# Patient Record
Sex: FEMALE | Race: WHITE | NOT HISPANIC OR LATINO | Employment: UNEMPLOYED | URBAN - METROPOLITAN AREA
[De-identification: names, ages, dates, MRNs, and addresses within clinical notes are randomized per-mention and may not be internally consistent; named-entity substitution may affect disease eponyms.]

---

## 2023-05-30 ENCOUNTER — OFFICE VISIT (OUTPATIENT)
Dept: PRIMARY CARE | Facility: CLINIC | Age: 38
End: 2023-05-30
Payer: COMMERCIAL

## 2023-05-30 VITALS
BODY MASS INDEX: 20.66 KG/M2 | SYSTOLIC BLOOD PRESSURE: 110 MMHG | HEART RATE: 74 BPM | WEIGHT: 124 LBS | OXYGEN SATURATION: 100 % | HEIGHT: 65 IN | DIASTOLIC BLOOD PRESSURE: 70 MMHG

## 2023-05-30 DIAGNOSIS — Z00.00 HEALTH CARE MAINTENANCE: ICD-10-CM

## 2023-05-30 DIAGNOSIS — R59.9 SWELLING OF LYMPH NODE: Primary | ICD-10-CM

## 2023-05-30 PROBLEM — K21.9 GERD (GASTROESOPHAGEAL REFLUX DISEASE): Status: ACTIVE | Noted: 2023-05-30

## 2023-05-30 PROBLEM — F41.9 ANXIETY: Status: ACTIVE | Noted: 2023-05-30

## 2023-05-30 PROBLEM — R60.0 SUBMANDIBULAR GLAND SWELLING: Status: ACTIVE | Noted: 2023-05-30

## 2023-05-30 PROBLEM — K42.9 PERIUMBILICAL HERNIA: Status: RESOLVED | Noted: 2022-10-12 | Resolved: 2023-05-30

## 2023-05-30 PROCEDURE — 1036F TOBACCO NON-USER: CPT | Performed by: FAMILY MEDICINE

## 2023-05-30 PROCEDURE — 99202 OFFICE O/P NEW SF 15 MIN: CPT | Performed by: FAMILY MEDICINE

## 2023-05-30 RX ORDER — PANTOPRAZOLE SODIUM 40 MG/1
1 TABLET, DELAYED RELEASE ORAL DAILY
COMMUNITY
Start: 2022-10-12 | End: 2024-01-04 | Stop reason: SDUPTHER

## 2023-05-30 RX ORDER — DROSPIRENONE AND ETHINYL ESTRADIOL 0.02-3(28)
1 KIT ORAL
COMMUNITY
Start: 2022-06-20 | End: 2023-10-30 | Stop reason: ALTCHOICE

## 2023-05-30 RX ORDER — ALPRAZOLAM 0.25 MG/1
0.25 TABLET ORAL NIGHTLY PRN
COMMUNITY

## 2023-05-30 NOTE — PROGRESS NOTES
Subjective   Patient ID: Cristine Ann is a 37 y.o. female who presents for Swollen Glands (Some pain on the right side of her neck, some pain that comes and goes has been going on for a couple months. ).  HPI  MONO when she was 10 years old and had significant lymph node swelling - right side never went back to normal    H/o Recurring strep in the past - no recent positive culture    [facility] - negative strep  Right sided adenopathy was notable  Took a Z pack and seemed to help     2 weeks later had itching along her breasts - saw gyn, Dr. Paula  He noted  right sided gland swelling    Saw ENT and she is scheduled for CT scan next week  Follow up scan     Pain into her ears, minimal congestion but she is not taking anything on regular basis to help with allergies  No fever or chills, does not feel sick     She has history of suspected GERD - on Pantoprazole but taking only prn - not regularly  No recent recurrence of GERD but she did not have significant classic GERD symptoms - more abdominal pain and indigestion    ANXIETY: ABE Tran at [facility], ob/gyn   Previously on Zoloft for postpartum depression - now off and only on Alprazolam prn for anxiety - takes every other day on the average    Review of Systems    Review of Systems negative except as noted in HPI and Chief complaint.     Objective               Physical Exam  Constitutional:       General: She is not in acute distress.     Appearance: Normal appearance. She is not ill-appearing.   HENT:      Head: Normocephalic and atraumatic.   Neck:      Vascular: No carotid bruit.      Comments: Very mild shotty adenopathy right anterior cervical  Cardiovascular:      Rate and Rhythm: Normal rate and regular rhythm.      Pulses: Normal pulses.      Heart sounds: Normal heart sounds. No murmur heard.     No gallop.   Pulmonary:      Effort: Pulmonary effort is normal.      Breath sounds: Normal breath sounds. No wheezing, rhonchi or rales.  "  Musculoskeletal:      Cervical back: Normal range of motion and neck supple. No rigidity or tenderness.   Lymphadenopathy:      Cervical: Cervical adenopathy present.   Skin:     General: Skin is warm and dry.   Neurological:      Mental Status: She is alert.   Psychiatric:         Mood and Affect: Mood normal.         Behavior: Behavior normal.       /70 (BP Location: Right arm, Patient Position: Sitting, BP Cuff Size: Adult)   Pulse 74   Ht 1.651 m (5' 5\")   Wt 56.2 kg (124 lb)   SpO2 100%   BMI 20.63 kg/m²      Assessment/Plan   Problem List Items Addressed This Visit          Immune    Swelling of lymph node - Primary    Relevant Orders    Comprehensive Metabolic Panel     Other Visit Diagnoses       Health care maintenance        Relevant Orders    Comprehensive Metabolic Panel    Lipid Panel    TSH with reflex to Free T4 if abnormal    Vitamin D, Total    CBC and Auto Differential          Checking labs as above - await CT scan results and recommendations of ENT.  Consider further workup or evaluation with ENT functional eval with ST  Recommend resuming Pantoprazole on regular basis to see if throat symptoms improve.  Consider trial of OTC antihistamine as well.    Suggest scheduling visit for CPE - lab orders placed as above     "

## 2023-10-26 ENCOUNTER — LAB (OUTPATIENT)
Dept: LAB | Facility: LAB | Age: 38
End: 2023-10-26
Payer: COMMERCIAL

## 2023-10-26 DIAGNOSIS — Z00.00 HEALTH CARE MAINTENANCE: ICD-10-CM

## 2023-10-26 DIAGNOSIS — R59.9 SWELLING OF LYMPH NODE: ICD-10-CM

## 2023-10-26 LAB
25(OH)D3 SERPL-MCNC: 24 NG/ML (ref 30–100)
ALBUMIN SERPL BCP-MCNC: 4.6 G/DL (ref 3.4–5)
ALP SERPL-CCNC: 50 U/L (ref 33–110)
ALT SERPL W P-5'-P-CCNC: 11 U/L (ref 7–45)
ANION GAP SERPL CALC-SCNC: 13 MMOL/L (ref 10–20)
AST SERPL W P-5'-P-CCNC: 16 U/L (ref 9–39)
BASOPHILS # BLD AUTO: 0.07 X10*3/UL (ref 0–0.1)
BASOPHILS NFR BLD AUTO: 1.4 %
BILIRUB SERPL-MCNC: 1 MG/DL (ref 0–1.2)
BUN SERPL-MCNC: 10 MG/DL (ref 6–23)
CALCIUM SERPL-MCNC: 9.7 MG/DL (ref 8.6–10.6)
CHLORIDE SERPL-SCNC: 101 MMOL/L (ref 98–107)
CHOLEST SERPL-MCNC: 213 MG/DL (ref 0–199)
CHOLESTEROL/HDL RATIO: 2.8
CO2 SERPL-SCNC: 29 MMOL/L (ref 21–32)
CREAT SERPL-MCNC: 0.74 MG/DL (ref 0.5–1.05)
EOSINOPHIL # BLD AUTO: 0.43 X10*3/UL (ref 0–0.7)
EOSINOPHIL NFR BLD AUTO: 8.3 %
ERYTHROCYTE [DISTWIDTH] IN BLOOD BY AUTOMATED COUNT: 13.5 % (ref 11.5–14.5)
GFR SERPL CREATININE-BSD FRML MDRD: >90 ML/MIN/1.73M*2
GLUCOSE SERPL-MCNC: 94 MG/DL (ref 74–99)
HCT VFR BLD AUTO: 41 % (ref 36–46)
HDLC SERPL-MCNC: 74.8 MG/DL
HGB BLD-MCNC: 13 G/DL (ref 12–16)
IMM GRANULOCYTES # BLD AUTO: 0 X10*3/UL (ref 0–0.7)
IMM GRANULOCYTES NFR BLD AUTO: 0 % (ref 0–0.9)
LDLC SERPL CALC-MCNC: 123 MG/DL
LYMPHOCYTES # BLD AUTO: 1.65 X10*3/UL (ref 1.2–4.8)
LYMPHOCYTES NFR BLD AUTO: 32 %
MCH RBC QN AUTO: 29.3 PG (ref 26–34)
MCHC RBC AUTO-ENTMCNC: 31.7 G/DL (ref 32–36)
MCV RBC AUTO: 92 FL (ref 80–100)
MONOCYTES # BLD AUTO: 0.4 X10*3/UL (ref 0.1–1)
MONOCYTES NFR BLD AUTO: 7.8 %
NEUTROPHILS # BLD AUTO: 2.6 X10*3/UL (ref 1.2–7.7)
NEUTROPHILS NFR BLD AUTO: 50.5 %
NON HDL CHOLESTEROL: 138 MG/DL (ref 0–149)
NRBC BLD-RTO: 0 /100 WBCS (ref 0–0)
PLATELET # BLD AUTO: 282 X10*3/UL (ref 150–450)
PMV BLD AUTO: 10.3 FL (ref 7.5–11.5)
POTASSIUM SERPL-SCNC: 4.7 MMOL/L (ref 3.5–5.3)
PROT SERPL-MCNC: 7.5 G/DL (ref 6.4–8.2)
RBC # BLD AUTO: 4.44 X10*6/UL (ref 4–5.2)
SODIUM SERPL-SCNC: 138 MMOL/L (ref 136–145)
TRIGL SERPL-MCNC: 75 MG/DL (ref 0–149)
TSH SERPL-ACNC: 1.47 MIU/L (ref 0.44–3.98)
VLDL: 15 MG/DL (ref 0–40)
WBC # BLD AUTO: 5.2 X10*3/UL (ref 4.4–11.3)

## 2023-10-26 PROCEDURE — 82306 VITAMIN D 25 HYDROXY: CPT

## 2023-10-26 PROCEDURE — 85025 COMPLETE CBC W/AUTO DIFF WBC: CPT

## 2023-10-26 PROCEDURE — 84443 ASSAY THYROID STIM HORMONE: CPT

## 2023-10-26 PROCEDURE — 36415 COLL VENOUS BLD VENIPUNCTURE: CPT

## 2023-10-26 PROCEDURE — 80053 COMPREHEN METABOLIC PANEL: CPT

## 2023-10-26 PROCEDURE — 80061 LIPID PANEL: CPT

## 2023-10-30 ENCOUNTER — OFFICE VISIT (OUTPATIENT)
Dept: PRIMARY CARE | Facility: CLINIC | Age: 38
End: 2023-10-30
Payer: COMMERCIAL

## 2023-10-30 VITALS
HEART RATE: 74 BPM | DIASTOLIC BLOOD PRESSURE: 70 MMHG | OXYGEN SATURATION: 100 % | HEIGHT: 65 IN | WEIGHT: 127.4 LBS | SYSTOLIC BLOOD PRESSURE: 120 MMHG | BODY MASS INDEX: 21.23 KG/M2

## 2023-10-30 DIAGNOSIS — E55.9 VITAMIN D DEFICIENCY: Primary | ICD-10-CM

## 2023-10-30 PROCEDURE — 99395 PREV VISIT EST AGE 18-39: CPT | Performed by: FAMILY MEDICINE

## 2023-10-30 PROCEDURE — 1036F TOBACCO NON-USER: CPT | Performed by: FAMILY MEDICINE

## 2023-10-30 RX ORDER — ERGOCALCIFEROL 1.25 MG/1
50000 CAPSULE ORAL
Qty: 6 CAPSULE | Refills: 0 | Status: SHIPPED | OUTPATIENT
Start: 2023-10-30 | End: 2023-12-11

## 2023-10-30 RX ORDER — CITALOPRAM 20 MG/1
20 TABLET, FILM COATED ORAL
COMMUNITY
Start: 2023-10-18 | End: 2023-10-30 | Stop reason: ALTCHOICE

## 2023-10-30 ASSESSMENT — PATIENT HEALTH QUESTIONNAIRE - PHQ9
8. MOVING OR SPEAKING SO SLOWLY THAT OTHER PEOPLE COULD HAVE NOTICED. OR THE OPPOSITE, BEING SO FIGETY OR RESTLESS THAT YOU HAVE BEEN MOVING AROUND A LOT MORE THAN USUAL: NOT AT ALL
2. FEELING DOWN, DEPRESSED OR HOPELESS: NOT AT ALL
1. LITTLE INTEREST OR PLEASURE IN DOING THINGS: NOT AT ALL
10. IF YOU CHECKED OFF ANY PROBLEMS, HOW DIFFICULT HAVE THESE PROBLEMS MADE IT FOR YOU TO DO YOUR WORK, TAKE CARE OF THINGS AT HOME, OR GET ALONG WITH OTHER PEOPLE: NOT DIFFICULT AT ALL
9. THOUGHTS THAT YOU WOULD BE BETTER OFF DEAD, OR OF HURTING YOURSELF: NOT AT ALL
3. TROUBLE FALLING OR STAYING ASLEEP OR SLEEPING TOO MUCH: NOT AT ALL
7. TROUBLE CONCENTRATING ON THINGS, SUCH AS READING THE NEWSPAPER OR WATCHING TELEVISION: NOT AT ALL
SUM OF ALL RESPONSES TO PHQ QUESTIONS 1-9: 1
SUM OF ALL RESPONSES TO PHQ9 QUESTIONS 1 AND 2: 0
4. FEELING TIRED OR HAVING LITTLE ENERGY: SEVERAL DAYS
6. FEELING BAD ABOUT YOURSELF - OR THAT YOU ARE A FAILURE OR HAVE LET YOURSELF OR YOUR FAMILY DOWN: NOT AT ALL
5. POOR APPETITE OR OVEREATING: NOT AT ALL

## 2023-10-30 ASSESSMENT — ANXIETY QUESTIONNAIRES
5. BEING SO RESTLESS THAT IT IS HARD TO SIT STILL: NOT AT ALL
6. BECOMING EASILY ANNOYED OR IRRITABLE: MORE THAN HALF THE DAYS
1. FEELING NERVOUS, ANXIOUS, OR ON EDGE: SEVERAL DAYS
IF YOU CHECKED OFF ANY PROBLEMS ON THIS QUESTIONNAIRE, HOW DIFFICULT HAVE THESE PROBLEMS MADE IT FOR YOU TO DO YOUR WORK, TAKE CARE OF THINGS AT HOME, OR GET ALONG WITH OTHER PEOPLE: SOMEWHAT DIFFICULT
3. WORRYING TOO MUCH ABOUT DIFFERENT THINGS: SEVERAL DAYS
4. TROUBLE RELAXING: SEVERAL DAYS
GAD7 TOTAL SCORE: 7
7. FEELING AFRAID AS IF SOMETHING AWFUL MIGHT HAPPEN: SEVERAL DAYS
2. NOT BEING ABLE TO STOP OR CONTROL WORRYING: SEVERAL DAYS

## 2023-10-30 ASSESSMENT — LIFESTYLE VARIABLES
HOW OFTEN DO YOU HAVE A DRINK CONTAINING ALCOHOL: 2-4 TIMES A MONTH
AUDIT-C TOTAL SCORE: 2
HOW OFTEN DO YOU HAVE SIX OR MORE DRINKS ON ONE OCCASION: NEVER
HOW MANY STANDARD DRINKS CONTAINING ALCOHOL DO YOU HAVE ON A TYPICAL DAY: 1 OR 2
SKIP TO QUESTIONS 9-10: 1

## 2023-10-30 NOTE — PROGRESS NOTES
"  Evin Ann is a 38 y.o. female and is here for a comprehensive physical exam.     Pain in her hands and feet on and off for the last month  Pain lasts 3 seconds or so and takes her breath away with severity   Resolved over the last week or so    Did have strep in the middle of the illness AND her kids were sick around the same time  No rash, no fever or chills    Do you take any herbs or supplements that were not prescribed by a doctor? no  Are you taking calcium supplements? no  Are you taking aspirin daily? no    SOC Hx:   Tobacco Use: Low Risk  (10/30/2023)    Patient History     Smoking Tobacco Use: Never     Smokeless Tobacco Use: Never     Passive Exposure: Not on file     Alcohol Use: Not At Risk (10/30/2023)    AUDIT-C     Frequency of Alcohol Consumption: 2-4 times a month     Average Number of Drinks: 1 or 2     Frequency of Binge Drinking: Never       DIET: regular, well rounded    EXERCISE:  not as regular as she would like    ELIMINATION: no constipation or diarrhea     No urinary issues    SLEEP: disturbed by 2 waking with toddler    MOODS: mild anxiety - followed Dr. Zhu at [facility]   PHQ-9: Patient Health Questionnaire-9 Score: 1    EDIN-7: EDIN-7 Total Score: 7     OB/GYN: followed by Dr. Paula  History of breast cancer in Maternal Grandmother.  Menstrual cycles - regular - heavier since 3rd child       History:  LMP: No LMP recorded.  Last pap date: 3/13/2023  Abnormal pap? no  MAMMO: ordered for baseline by ob/gyn    Review of Systems   Review of Systems negative except as noted in HPI and Chief complaint.     Objective     /70 (BP Location: Left arm, Patient Position: Sitting, BP Cuff Size: Adult)   Pulse 74   Ht 1.651 m (5' 5\")   Wt 57.8 kg (127 lb 6.4 oz)   SpO2 100%   BMI 21.20 kg/m²      Physical Exam    Assessment/Plan   Healthy female exam.      1. Fasting labs reviewed today.     2. Patient Counseling:  --Nutrition: Stressed importance of moderation " in sodium/caffeine intake, saturated fat and cholesterol, caloric balance, sufficient intake of fresh fruits, vegetables, fiber, calcium, iron, and 1 mg of folate supplement per day (for females capable of pregnancy).  --Exercise: Stressed the importance of regular exercise.   --Immunizations reviewed.  --Discussed benefits of screening colonoscopy.    3. Discussed the patient's BMI with her.  The BMI is in the acceptable range.    Problem List Items Addressed This Visit    None  Visit Diagnoses       Vitamin D deficiency    -  Primary    Relevant Medications    ergocalciferol (Vitamin D-2) 1.25 MG (92278 UT) capsule         HAND PAIN: suspect possible viral illness, possible hand-foot-mouth; will monitor for any recurrence    Follow up next physical in 1 year

## 2024-01-04 ENCOUNTER — OFFICE VISIT (OUTPATIENT)
Dept: PRIMARY CARE | Facility: CLINIC | Age: 39
End: 2024-01-04
Payer: COMMERCIAL

## 2024-01-04 VITALS
WEIGHT: 128 LBS | OXYGEN SATURATION: 98 % | BODY MASS INDEX: 21.3 KG/M2 | SYSTOLIC BLOOD PRESSURE: 120 MMHG | HEART RATE: 73 BPM | DIASTOLIC BLOOD PRESSURE: 80 MMHG

## 2024-01-04 DIAGNOSIS — R00.2 PALPITATIONS: ICD-10-CM

## 2024-01-04 DIAGNOSIS — R01.1 MURMUR, CARDIAC: ICD-10-CM

## 2024-01-04 DIAGNOSIS — K29.00 ACUTE GASTRITIS WITHOUT HEMORRHAGE, UNSPECIFIED GASTRITIS TYPE: ICD-10-CM

## 2024-01-04 DIAGNOSIS — M94.0 COSTOCHONDRITIS: Primary | ICD-10-CM

## 2024-01-04 PROCEDURE — 1036F TOBACCO NON-USER: CPT | Performed by: FAMILY MEDICINE

## 2024-01-04 PROCEDURE — 99214 OFFICE O/P EST MOD 30 MIN: CPT | Performed by: FAMILY MEDICINE

## 2024-01-04 RX ORDER — ERGOCALCIFEROL 1.25 MG/1
1 CAPSULE ORAL
COMMUNITY
Start: 2023-12-17

## 2024-01-04 RX ORDER — MELOXICAM 15 MG/1
15 TABLET ORAL DAILY
Qty: 30 TABLET | Refills: 11 | Status: SHIPPED | OUTPATIENT
Start: 2024-01-04 | End: 2024-01-22 | Stop reason: SINTOL

## 2024-01-04 RX ORDER — PANTOPRAZOLE SODIUM 40 MG/1
40 TABLET, DELAYED RELEASE ORAL DAILY
Qty: 30 TABLET | Refills: 2 | Status: SHIPPED | OUTPATIENT
Start: 2024-01-04 | End: 2024-01-22

## 2024-01-04 ASSESSMENT — LIFESTYLE VARIABLES
HOW OFTEN DO YOU HAVE A DRINK CONTAINING ALCOHOL: 4 OR MORE TIMES A WEEK
AUDIT-C TOTAL SCORE: 4
SKIP TO QUESTIONS 9-10: 1
HOW OFTEN DO YOU HAVE SIX OR MORE DRINKS ON ONE OCCASION: NEVER
HOW MANY STANDARD DRINKS CONTAINING ALCOHOL DO YOU HAVE ON A TYPICAL DAY: 1 OR 2

## 2024-01-08 ENCOUNTER — PATIENT MESSAGE (OUTPATIENT)
Dept: PRIMARY CARE | Facility: CLINIC | Age: 39
End: 2024-01-08

## 2024-01-11 ENCOUNTER — APPOINTMENT (OUTPATIENT)
Dept: PRIMARY CARE | Facility: CLINIC | Age: 39
End: 2024-01-11

## 2024-01-19 ENCOUNTER — HOSPITAL ENCOUNTER (OUTPATIENT)
Dept: CARDIOLOGY | Facility: CLINIC | Age: 39
Discharge: HOME | End: 2024-01-19
Payer: COMMERCIAL

## 2024-01-19 DIAGNOSIS — R01.1 MURMUR, CARDIAC: Primary | ICD-10-CM

## 2024-01-19 DIAGNOSIS — R01.1 MURMUR, CARDIAC: ICD-10-CM

## 2024-01-19 LAB
AORTIC VALVE PEAK VELOCITY: 1.31 M/S
AV PEAK GRADIENT: 6.9 MMHG
AVA (PEAK VEL): 2.21 CM2
EJECTION FRACTION APICAL 4 CHAMBER: 63.8
EJECTION FRACTION: 60 %
LEFT ATRIUM VOLUME AREA LENGTH INDEX BSA: 16.2 ML/M2
LEFT VENTRICLE INTERNAL DIMENSION DIASTOLE: 3.9 CM (ref 3.5–6)
LEFT VENTRICULAR OUTFLOW TRACT DIAMETER: 1.82 CM
MITRAL VALVE E/A RATIO: 1.33
MITRAL VALVE E/E' RATIO: 6.09
RIGHT VENTRICLE FREE WALL PEAK S': 13 CM/S
RIGHT VENTRICLE PEAK SYSTOLIC PRESSURE: 21.2 MMHG
TRICUSPID ANNULAR PLANE SYSTOLIC EXCURSION: 2.8 CM

## 2024-01-19 PROCEDURE — 93306 TTE W/DOPPLER COMPLETE: CPT | Performed by: INTERNAL MEDICINE

## 2024-01-19 PROCEDURE — 93306 TTE W/DOPPLER COMPLETE: CPT

## 2024-01-22 ENCOUNTER — OFFICE VISIT (OUTPATIENT)
Dept: PRIMARY CARE | Facility: CLINIC | Age: 39
End: 2024-01-22
Payer: COMMERCIAL

## 2024-01-22 VITALS
HEART RATE: 92 BPM | WEIGHT: 129 LBS | OXYGEN SATURATION: 98 % | BODY MASS INDEX: 21.47 KG/M2 | SYSTOLIC BLOOD PRESSURE: 124 MMHG | DIASTOLIC BLOOD PRESSURE: 78 MMHG

## 2024-01-22 DIAGNOSIS — F41.9 ANXIETY: ICD-10-CM

## 2024-01-22 DIAGNOSIS — R10.30 LOWER ABDOMINAL PAIN: Primary | ICD-10-CM

## 2024-01-22 PROCEDURE — 1036F TOBACCO NON-USER: CPT | Performed by: FAMILY MEDICINE

## 2024-01-22 PROCEDURE — 99214 OFFICE O/P EST MOD 30 MIN: CPT | Performed by: FAMILY MEDICINE

## 2024-01-22 RX ORDER — ESCITALOPRAM OXALATE 10 MG/1
10 TABLET ORAL DAILY
Qty: 30 TABLET | Refills: 1 | Status: SHIPPED | OUTPATIENT
Start: 2024-01-22

## 2024-01-22 NOTE — PROGRESS NOTES
Subjective   Patient ID: Cristine Ann is a 38 y.o. female who presents for Abdominal Pain.    HPI    LEFT SIDED ABDOMINAL PAIN:   Pain described as burning - comes and goes  One episode after yelling at her dogs  Shoveling snow and came in and ha severe pain when she was done   Pain with sitting up or using her abs  No changes in activity- no regular exercise    Tried Meloxicam for 5 days but had headaches and stopped  No change in symptoms    Review of Systems    Review of Systems negative except as noted in HPI and Chief complaint.     Objective                 /78 (BP Location: Left arm, Patient Position: Sitting, BP Cuff Size: Adult)   Pulse 92   Wt 58.5 kg (129 lb)   SpO2 98%   BMI 21.47 kg/m²      Physical Exam  Constitutional:       General: She is not in acute distress.     Appearance: Normal appearance. She is not ill-appearing.   HENT:      Head: Normocephalic and atraumatic.   Neck:      Vascular: No carotid bruit.   Cardiovascular:      Rate and Rhythm: Normal rate and regular rhythm.      Pulses: Normal pulses.      Heart sounds: Normal heart sounds. No murmur heard.     No gallop.   Pulmonary:      Effort: Pulmonary effort is normal.      Breath sounds: Normal breath sounds. No wheezing, rhonchi or rales.   Abdominal:      General: Abdomen is flat. Bowel sounds are normal. There is no distension.      Palpations: Abdomen is soft. There is no mass.      Tenderness: There is no abdominal tenderness. There is no right CVA tenderness, left CVA tenderness, guarding or rebound.      Hernia: No hernia is present.   Musculoskeletal:      Cervical back: Normal range of motion and neck supple. No rigidity or tenderness.   Lymphadenopathy:      Cervical: No cervical adenopathy.   Skin:     General: Skin is warm and dry.   Neurological:      Mental Status: She is alert.   Psychiatric:         Mood and Affect: Mood normal.         Behavior: Behavior normal.         Assessment/Plan   Problem List Items  Addressed This Visit       Anxiety     Discussed possible treatments for generalized anxiety.  Rx for Escitalopram given.  Follow up 4-6 weeks for recheck after starting medication.    Consider referral to counseling or our Behavioral Collaborative Care Team.         Relevant Medications    escitalopram (Lexapro) 10 mg tablet     Other Visit Diagnoses       Lower abdominal pain    -  Primary    suspect musculoskeletal or cartilige source of pain.  If not improving consider additional imaging.

## 2024-01-29 ENCOUNTER — PATIENT MESSAGE (OUTPATIENT)
Dept: PRIMARY CARE | Facility: CLINIC | Age: 39
End: 2024-01-29

## 2024-01-29 DIAGNOSIS — R10.84 GENERALIZED ABDOMINAL PAIN: ICD-10-CM

## 2024-01-29 DIAGNOSIS — R10.30 LOWER ABDOMINAL PAIN: Primary | ICD-10-CM

## 2024-01-29 DIAGNOSIS — R10.9 FLANK PAIN: ICD-10-CM

## 2024-02-01 NOTE — ASSESSMENT & PLAN NOTE
Discussed possible treatments for generalized anxiety.  Rx for Escitalopram given.  Follow up 4-6 weeks for recheck after starting medication.    Consider referral to counseling or our Behavioral Collaborative Care Team.

## 2024-02-12 ENCOUNTER — HOSPITAL ENCOUNTER (OUTPATIENT)
Dept: RADIOLOGY | Facility: CLINIC | Age: 39
End: 2024-02-12

## 2024-02-15 ENCOUNTER — OFFICE VISIT (OUTPATIENT)
Dept: GASTROENTEROLOGY | Facility: CLINIC | Age: 39
End: 2024-02-15

## 2024-02-15 VITALS — HEART RATE: 84 BPM | BODY MASS INDEX: 20.99 KG/M2 | WEIGHT: 126 LBS | HEIGHT: 65 IN

## 2024-02-15 DIAGNOSIS — R10.12 LEFT UPPER QUADRANT ABDOMINAL PAIN: Primary | ICD-10-CM

## 2024-02-15 DIAGNOSIS — K90.49 FOOD INTOLERANCE: ICD-10-CM

## 2024-02-15 PROCEDURE — 1036F TOBACCO NON-USER: CPT | Performed by: INTERNAL MEDICINE

## 2024-02-15 PROCEDURE — 99204 OFFICE O/P NEW MOD 45 MIN: CPT | Performed by: INTERNAL MEDICINE

## 2024-02-15 RX ORDER — MELOXICAM 15 MG/1
15 TABLET ORAL
COMMUNITY
Start: 2024-01-04 | End: 2025-01-03

## 2024-02-15 ASSESSMENT — ENCOUNTER SYMPTOMS: SHORTNESS OF BREATH: 0

## 2024-02-15 NOTE — PROGRESS NOTES
REASON FOR VISIT:  Abdominal pain   PCP (requesting provider): Yuliana Elizabeth DO.    HPI:  Cristine Ann is a 38 y.o. female with a past medical history of anxiety being evaluated for abdominal pain. CT A/P w/ contrast unremarkable (10/2022).    She reports LUQ abdominal pain that will radiate into her back. She has sensation of something being swollen in the left side. She notes that the pain will feel like a burning or ache. She notes that the pain will feel like a knot. She reports that she had a CT scan in the ER done about 2 years ago at [facility] that was unrevealing. She was told to potentially follow-up with GI. She saw PCP regarding this as well recently. She notes the pain was exacerbated by a cough. She also noted the pain feels like it is in her rib cage. PCP thought that pain was possibly swollen cartilage. She has a lot of abdominal bloating with certain foods. This discomfort is intermittent and will last a few days at a time. Bowel habits are once daily. No regular NSAIDs. She has occasional reflux and has taken Prilosec in the past. She does struggle with anxiety but unsure if it contributes. No blood in the stool.    PSurgHx: No abdominal surgeries     FamHx: No GI cancer    PAST MEDICAL HISTORY  Past Medical History:   Diagnosis Date    Periumbilical hernia 10/12/2022    Formatting of this note might be different from the original. Seen on CT 10/2022.Small ventral periumbilical hernia, containing peritoneal fat and short segment loops of bowel.       PAST SURGICAL HISTORY  No past surgical history on file.    FAMILY HISTORY  Family History   Problem Relation Name Age of Onset    Breast cancer Maternal Grandmother         SOCIAL HISTORY   reports that she has never smoked. She has never used smokeless tobacco. She reports current alcohol use of about 7.0 standard drinks of alcohol per week. She reports that she does not use drugs.    REVIEW OF SYSTEMS  Review of Systems   Respiratory:  Negative for  shortness of breath.    Cardiovascular:  Negative for chest pain.   All other systems reviewed and are negative.    A 10+ point review of systems was otherwise negative except as noted and per HPI.    ALLERGIES  Allergies   Allergen Reactions    Erythromycin Rash and Unknown     Patient states she can tolerate Azythromycin.    Penicillins Unknown     Childhood    Sulfa (Sulfonamide Antibiotics) Unknown     Childhood    Sulfamethoxazole Unknown       MEDICATIONS  Current Outpatient Medications   Medication Instructions    ALPRAZolam (XANAX) 0.25 mg, oral, Nightly PRN    ergocalciferol (Vitamin D-2) 1.25 MG (19927 UT) capsule 1 capsule, oral, Weekly    escitalopram (LEXAPRO) 10 mg, oral, Daily    meloxicam (MOBIC) 15 mg, oral, Daily RT       VITALS  Vitals:    02/15/24 1524   Pulse: 84      Body mass index is 21.29 kg/m².    PHYSICAL EXAM  CONSTITUTIONAL: NAD, appears stated age  EYES: anicteric sclera, sclera clear  HEAD: normocephalic, atraumatic   NECK: supple   PULMONARY: CTAB  CARDIOVASCULAR: RRR, no M/R/G appreciated   ABDOMEN: soft, NTND, +BS, no rebound or guarding   MUSCULOSKELETAL: no edema  SKIN: no jaundice   PSYCHIATRIC: AOx3, appropriate insight and judgement    LABS  WBC (x10*3/uL)   Date Value   10/26/2023 5.2     Hemoglobin (g/dL)   Date Value   10/26/2023 13.0     Platelets (x10*3/uL)   Date Value   10/26/2023 282     Sodium (mmol/L)   Date Value   10/26/2023 138     Potassium (mmol/L)   Date Value   10/26/2023 4.7     Chloride (mmol/L)   Date Value   10/26/2023 101     Bicarbonate (mmol/L)   Date Value   10/26/2023 29     Urea Nitrogen (mg/dL)   Date Value   10/26/2023 10     Creatinine (mg/dL)   Date Value   10/26/2023 0.74     Calcium (mg/dL)   Date Value   10/26/2023 9.7     Total Protein (g/dL)   Date Value   10/26/2023 7.5     Bilirubin, Total (mg/dL)   Date Value   10/26/2023 1.0     Alkaline Phosphatase (U/L)   Date Value   10/26/2023 50     ALT (U/L)   Date Value   10/26/2023 11     AST  "(U/L)   Date Value   10/26/2023 16     Glucose (mg/dL)   Date Value   10/26/2023 94     No results found for: \"LIPASE\", \"CRP\"    ASSESSMENT/PLAN  Cristine Ann is a 38 y.o. female with a past medical history of anxiety being evaluated for abdominal pain. CT A/P w/ contrast unremarkable (10/2022). Patient with intermittent left-sided rib cage and back pain that is sometimes positional. This pain sounds more musculoskeletal or possibly costochondritis.  Given the reassuring CT for the same symptoms, I have a low suspicion that a gastrointestinal issue is the cause of this pain. She does note some food intolerances so we will screen for celiac disease.    -Check celiac panel  -If celiac panel negative, then would hold off on further GI evaluation as unlikely to be high yield  -Consider discussion with PCP regarding costochondritis versus musculoskeletal pain    Follow-up in the office PRN.    Signature: Yg Pozo MD  "

## 2024-02-15 NOTE — PATIENT INSTRUCTIONS
Check blood test for celiac disease. If this is unrevealing, then I do not feel strongly about further GI testing as I have a lower suspicion that your left-sided back and rib cage pain is related to a gastrointestinal problem. Consider discussion with your primary care doctor about costochondritis or musculoskeletal pain.

## 2024-02-26 ENCOUNTER — HOSPITAL ENCOUNTER (OUTPATIENT)
Dept: RADIOLOGY | Facility: CLINIC | Age: 39
End: 2024-02-26

## 2024-04-11 NOTE — PROGRESS NOTES
"Subjective   Patient ID: Cristine Ann is a 38 y.o. female who presents for Abdominal Pain (Left upper quadrant, has been going on and off for over a year now./Denies constipation, diarrhea, vomiting.).    HPI    ABDOMINAL PAIN:   Initially seen at [facility]  ED 10/2022  Left sided abdominal pain  CT negative for acute pathology  Burning sensation started again after illness and sickness  \"Feels tight\"    NO changes in bowel pattern, no urinary symptoms.    Review of Systems    Review of Systems negative except as noted in HPI and Chief complaint.     Objective                 /80 (BP Location: Left arm, Patient Position: Sitting, BP Cuff Size: Adult)   Pulse 73   Wt 58.1 kg (128 lb)   SpO2 98%   BMI 21.30 kg/m²      Physical Exam  Constitutional:       General: She is not in acute distress.     Appearance: Normal appearance. She is not ill-appearing.   HENT:      Head: Normocephalic and atraumatic.   Neck:      Vascular: No carotid bruit.   Cardiovascular:      Rate and Rhythm: Normal rate and regular rhythm.      Pulses: Normal pulses.      Heart sounds: Murmur (systolic murmur best heard at RUSB) heard.      No gallop.   Pulmonary:      Effort: Pulmonary effort is normal.      Breath sounds: Normal breath sounds. No wheezing, rhonchi or rales.   Abdominal:      General: Abdomen is flat. Bowel sounds are normal. There is no distension.      Palpations: Abdomen is soft.      Tenderness: There is no abdominal tenderness. There is no right CVA tenderness, guarding or rebound.   Musculoskeletal:      Cervical back: Normal range of motion and neck supple. No rigidity or tenderness.      Comments: Minimal tenderness to palpation over lef tlateral rib angles  Normal breathing pattern   Lymphadenopathy:      Cervical: No cervical adenopathy.   Skin:     General: Skin is warm and dry.   Neurological:      Mental Status: She is alert.   Psychiatric:         Mood and Affect: Mood normal.         Behavior: Behavior " normal.         Assessment/Plan   Problem List Items Addressed This Visit    None  Visit Diagnoses       Costochondritis    -  Primary    NSAIDs and covering with PPI for stomach protection.   Consdier Chest xray or rib films.    Relevant Medications    meloxicam (Mobic) 15 mg tablet    Acute gastritis without hemorrhage, unspecified gastritis type        Covering with PPI for now - if helping would take for 4 weeks and then try stopping.    Relevant Medications    pantoprazole (ProtoNix) 40 mg EC tablet    Murmur, cardiac        Echo orderd to re-evaluate newly found murmur.    Relevant Orders    Transthoracic Echo (TTE) Limited    Palpitations        Holter montor, and will call with results.    Relevant Orders    Transthoracic Echo (TTE) Limited          FOLLOW UP FOR REVIEW OF ECHO.      35.3

## 2024-06-20 ENCOUNTER — APPOINTMENT (OUTPATIENT)
Dept: PRIMARY CARE | Facility: CLINIC | Age: 39
End: 2024-06-20
Payer: COMMERCIAL

## 2024-08-28 ENCOUNTER — HOSPITAL ENCOUNTER (OUTPATIENT)
Dept: RADIOLOGY | Facility: CLINIC | Age: 39
End: 2024-08-28
Payer: COMMERCIAL

## 2024-09-06 ENCOUNTER — APPOINTMENT (OUTPATIENT)
Dept: RADIOLOGY | Facility: CLINIC | Age: 39
End: 2024-09-06
Payer: COMMERCIAL

## 2024-09-18 ENCOUNTER — APPOINTMENT (OUTPATIENT)
Dept: RADIOLOGY | Facility: CLINIC | Age: 39
End: 2024-09-18
Payer: COMMERCIAL

## 2024-11-07 ENCOUNTER — APPOINTMENT (OUTPATIENT)
Dept: OBSTETRICS AND GYNECOLOGY | Facility: CLINIC | Age: 39
End: 2024-11-07
Payer: COMMERCIAL

## 2024-11-07 VITALS — HEIGHT: 65 IN | WEIGHT: 122 LBS | BODY MASS INDEX: 20.33 KG/M2

## 2024-11-07 DIAGNOSIS — N63.20 MASS OF LEFT BREAST, UNSPECIFIED QUADRANT: ICD-10-CM

## 2024-11-07 PROCEDURE — 3008F BODY MASS INDEX DOCD: CPT | Performed by: OBSTETRICS & GYNECOLOGY

## 2024-11-07 PROCEDURE — 1036F TOBACCO NON-USER: CPT | Performed by: OBSTETRICS & GYNECOLOGY

## 2024-11-07 PROCEDURE — 99213 OFFICE O/P EST LOW 20 MIN: CPT | Performed by: OBSTETRICS & GYNECOLOGY

## 2024-11-07 RX ORDER — DOXYCYCLINE HYCLATE 100 MG
100 TABLET ORAL 2 TIMES DAILY
COMMUNITY
Start: 2024-11-04 | End: 2024-11-11

## 2024-11-13 ENCOUNTER — HOSPITAL ENCOUNTER (OUTPATIENT)
Dept: RADIOLOGY | Facility: CLINIC | Age: 39
Discharge: HOME | End: 2024-11-13
Payer: COMMERCIAL

## 2024-11-13 VITALS — WEIGHT: 121.91 LBS | BODY MASS INDEX: 20.81 KG/M2 | HEIGHT: 64 IN

## 2024-11-13 DIAGNOSIS — N63.20 MASS OF LEFT BREAST, UNSPECIFIED QUADRANT: ICD-10-CM

## 2024-11-13 PROCEDURE — 77066 DX MAMMO INCL CAD BI: CPT | Performed by: RADIOLOGY

## 2024-11-13 PROCEDURE — 77062 BREAST TOMOSYNTHESIS BI: CPT

## 2024-11-13 PROCEDURE — 76642 ULTRASOUND BREAST LIMITED: CPT | Performed by: RADIOLOGY

## 2024-11-13 PROCEDURE — 76642 ULTRASOUND BREAST LIMITED: CPT | Mod: LT

## 2024-11-13 PROCEDURE — 76981 USE PARENCHYMA: CPT | Mod: LT

## 2024-11-13 PROCEDURE — 77062 BREAST TOMOSYNTHESIS BI: CPT | Performed by: RADIOLOGY

## 2024-12-21 ENCOUNTER — ANCILLARY PROCEDURE (OUTPATIENT)
Dept: URGENT CARE | Age: 39
End: 2024-12-21
Payer: COMMERCIAL

## 2024-12-21 ENCOUNTER — OFFICE VISIT (OUTPATIENT)
Dept: URGENT CARE | Age: 39
End: 2024-12-21
Payer: COMMERCIAL

## 2024-12-21 VITALS
HEART RATE: 117 BPM | RESPIRATION RATE: 16 BRPM | SYSTOLIC BLOOD PRESSURE: 140 MMHG | DIASTOLIC BLOOD PRESSURE: 87 MMHG | OXYGEN SATURATION: 98 % | TEMPERATURE: 98 F

## 2024-12-21 DIAGNOSIS — J18.9 PNEUMONIA OF RIGHT LOWER LOBE DUE TO INFECTIOUS ORGANISM: ICD-10-CM

## 2024-12-21 DIAGNOSIS — R50.9 FEVER AND CHILLS: ICD-10-CM

## 2024-12-21 DIAGNOSIS — R05.1 ACUTE COUGH: ICD-10-CM

## 2024-12-21 DIAGNOSIS — U07.1 ACUTE COVID-19: Primary | ICD-10-CM

## 2024-12-21 LAB
POC RAPID INFLUENZA A: NEGATIVE
POC RAPID INFLUENZA B: NEGATIVE
POC RAPID STREP: NEGATIVE
POC SARS-COV-2 AG BINAX: ABNORMAL

## 2024-12-21 PROCEDURE — 71046 X-RAY EXAM CHEST 2 VIEWS: CPT

## 2024-12-21 RX ORDER — DOXYCYCLINE 100 MG/1
100 CAPSULE ORAL 2 TIMES DAILY
Qty: 14 CAPSULE | Refills: 0 | Status: SHIPPED | OUTPATIENT
Start: 2024-12-21 | End: 2024-12-28

## 2024-12-21 ASSESSMENT — ENCOUNTER SYMPTOMS: FEVER: 1

## 2024-12-21 NOTE — PROGRESS NOTES
Subjective   History of Present Illness: Cristine Ann is a 39 y.o. female. They present today with a chief complaint of Fever (C/O fever. Back pain, cough, chills, for the last 2 days. ).          Past Medical History  Allergies as of 12/21/2024 - Reviewed 12/21/2024   Allergen Reaction Noted    Erythromycin Rash and Unknown 07/29/2013    Penicillins Unknown 06/07/2005    Sulfa (sulfonamide antibiotics) Unknown 06/07/2005       (Not in a hospital admission)       Past Medical History:   Diagnosis Date    Periumbilical hernia 10/12/2022    Formatting of this note might be different from the original. Seen on CT 10/2022.Small ventral periumbilical hernia, containing peritoneal fat and short segment loops of bowel.       Past Surgical History:   Procedure Laterality Date    NO PAST SURGERIES          reports that she has never smoked. She has never been exposed to tobacco smoke. She has never used smokeless tobacco. She reports current alcohol use of about 7.0 standard drinks of alcohol per week. She reports that she does not use drugs.    Review of Systems  Review of Systems   Constitutional:  Positive for fever.                                  Objective    Vitals:    12/21/24 1227   BP: 140/87   BP Location: Left arm   Patient Position: Sitting   BP Cuff Size: Adult   Pulse: (!) 117   Resp: 16   Temp: 36.7 °C (98 °F)   TempSrc: Oral   SpO2: 98%     No LMP recorded.    Physical Exam  Vitals reviewed.   HENT:      Head: Normocephalic and atraumatic.      Nose: Nose normal.      Mouth/Throat:      Mouth: Mucous membranes are moist.   Eyes:      Extraocular Movements: Extraocular movements intact.      Conjunctiva/sclera: Conjunctivae normal.      Pupils: Pupils are equal, round, and reactive to light.   Cardiovascular:      Rate and Rhythm: Normal rate and regular rhythm.   Pulmonary:      Effort: Pulmonary effort is normal.      Breath sounds: Normal breath sounds.   Skin:     General: Skin is warm.   Neurological:       Mental Status: She is alert and oriented to person, place, and time.   Psychiatric:         Mood and Affect: Mood normal.         Behavior: Behavior normal.             Point of Care Test & Imaging Results from this visit  Results for orders placed or performed in visit on 12/21/24   POCT Covid-19 Rapid Antigen   Result Value Ref Range    POC RAMYA-COV-2 AG Positive test for SARS-CoV-2 (antigen detected) (A) Presumptive negative test for SARS-CoV-2 (no antigen detected)   POCT Influenza A/B manually resulted   Result Value Ref Range    POC Rapid Influenza A Negative Negative    POC Rapid Influenza B Negative Negative   POCT rapid strep A manually resulted   Result Value Ref Range    POC Rapid Strep Negative Negative      XR chest 2 views    Result Date: 12/21/2024  Interpreted By:  Christiano Park, STUDY: XR CHEST 2 VIEWS; 12/21/2024 12:51 pm   INDICATION: Signs/Symptoms:cough   COMPARISON: None.   ACCESSION NUMBER(S): [id number]   ORDERING CLINICIAN: BILL OREILLY   TECHNIQUE: Frontal and lateral radiographs of the chest were obtained.   FINDINGS: LINES AND DEVICES: None.   LUNGS: Small patchy airspace opacities in the right lower lobe. No pulmonary edema, pleural effusion or pneumothorax.   CARDIOMEDIASTINAL SILHOUETTE: The cardiomediastinal silhouette is within normal limits.   OTHER: No acute osseous abnormality.       Suspected right lower lobe pneumonia. Consider follow-up to resolution in 8-12 weeks.   MACRO None   Signed by: Christiano Park 12/21/2024 1:20 PM Dictation workstation:   [id number]     Diagnostic study results (if any) were reviewed by Bill Oreilly PA-C.    Assessment/Plan   Allergies, medications, history, and pertinent labs/EKGs/Imaging reviewed by Bill Oreilly PA-C.     Medical Decision Making  - positive covid. Negative flu a & b and rapid strep. CXR shows Suspected right lower lobe pneumonia. Will treat with doxycycline for pneumonia and she'll take dayquil and nyquil for covid  symptoms. Consider follow-up for pneumonia  resolution in 8-12 weeks.   -         Patient is educated about their diagnoses.     -          Discussed medications benefits and adverse effects.     -          Answered all patient’s questions.     -          Patient will call 911 or go to the nearest ED if worsen symptoms .     -          Patient is agreeable to the plan of care and is deemed stable upon discharge.     -          Follow up with your primary care provider in two days.      Orders and Diagnoses  Diagnoses and all orders for this visit:  Acute COVID-19  Fever and chills  -     POCT rapid strep A manually resulted  Acute cough  -     POCT Covid-19 Rapid Antigen  -     POCT Influenza A/B manually resulted  -     XR chest 2 views; Future  Pneumonia of right lower lobe due to infectious organism  -     doxycycline (Vibramycin) 100 mg capsule; Take 1 capsule (100 mg) by mouth 2 times a day for 7 days.      Medical Admin Record      Patient disposition: Home    Electronically signed by Darshan Oreilly PA-C  1:38 PM

## 2024-12-23 ENCOUNTER — OFFICE VISIT (OUTPATIENT)
Dept: URGENT CARE | Age: 39
End: 2024-12-23
Payer: COMMERCIAL

## 2024-12-23 ENCOUNTER — APPOINTMENT (OUTPATIENT)
Dept: URGENT CARE | Age: 39
End: 2024-12-23
Payer: COMMERCIAL

## 2024-12-23 VITALS
DIASTOLIC BLOOD PRESSURE: 83 MMHG | TEMPERATURE: 98.4 F | HEART RATE: 83 BPM | OXYGEN SATURATION: 99 % | SYSTOLIC BLOOD PRESSURE: 122 MMHG

## 2024-12-23 DIAGNOSIS — J18.9 PNEUMONIA DUE TO INFECTIOUS ORGANISM, UNSPECIFIED LATERALITY, UNSPECIFIED PART OF LUNG: ICD-10-CM

## 2024-12-23 DIAGNOSIS — U07.1 COVID-19: Primary | ICD-10-CM

## 2024-12-23 PROCEDURE — 99213 OFFICE O/P EST LOW 20 MIN: CPT

## 2024-12-23 RX ORDER — AZITHROMYCIN 250 MG/1
TABLET, FILM COATED ORAL
Qty: 6 TABLET | Refills: 0 | Status: SHIPPED | OUTPATIENT
Start: 2024-12-23 | End: 2024-12-28

## 2024-12-23 RX ORDER — METHYLPREDNISOLONE 4 MG/1
TABLET ORAL
Qty: 21 TABLET | Refills: 0 | Status: SHIPPED | OUTPATIENT
Start: 2024-12-23 | End: 2024-12-29

## 2024-12-23 ASSESSMENT — ENCOUNTER SYMPTOMS
FATIGUE: 1
FEVER: 1
WHEEZING: 1

## 2024-12-23 NOTE — PATIENT INSTRUCTIONS
You were seen at Urgent Care today for pneumonia and COVID infection. Please treat as discussed. Please take medications as prescribed. Monitor for red flags which we spoke about, If your symptoms change, worsen or become concerning in any way, please go to the emergency room immediately, otherwise you can followup with your PCP in 2-3 days as needed

## 2024-12-23 NOTE — PROGRESS NOTES
Subjective   Patient ID: Cristine Ann is a 39 y.o. female. They present today with a chief complaint of Pneumonia (Patient was seen in clinic 2 days ago at a different location and she feels like her condition is not getting any relief. She tested positive for COVID and has pneumonia. She is starting to wheeze. ).    History of Present Illness  Patient is a 39-year-old female with no relevant past medical history presents urgent care today with concern for worsening flulike symptoms.  She states she was diagnosed with both COVID and pneumonia on Saturday.  She was provided with a prescription for doxycycline which she has been taking as directed.  She notes she continues to have fevers and some wheezing.  She denies any chest pain or shortness of breath.  No other complaints or concerns mentions time.      History provided by:  Patient  Pneumonia  Associated symptoms: congestion, fatigue, fever and wheezing        Past Medical History  Allergies as of 12/23/2024 - Reviewed 12/23/2024   Allergen Reaction Noted    Erythromycin Rash and Unknown 07/29/2013    Penicillins Unknown 06/07/2005    Sulfa (sulfonamide antibiotics) Unknown 06/07/2005       (Not in a hospital admission)         Past Medical History:   Diagnosis Date    Periumbilical hernia 10/12/2022    Formatting of this note might be different from the original. Seen on CT 10/2022.Small ventral periumbilical hernia, containing peritoneal fat and short segment loops of bowel.       Past Surgical History:   Procedure Laterality Date    NO PAST SURGERIES          reports that she has never smoked. She has never been exposed to tobacco smoke. She has never used smokeless tobacco. She reports current alcohol use of about 7.0 standard drinks of alcohol per week. She reports that she does not use drugs.    Review of Systems  Review of Systems   Constitutional:  Positive for fatigue and fever.   HENT:  Positive for congestion.    Respiratory:  Positive for wheezing.                                    Objective    Vitals:    12/23/24 1759   BP: 122/83   BP Location: Left arm   Patient Position: Sitting   BP Cuff Size: Large adult   Pulse: 83   Temp: 36.9 °C (98.4 °F)   TempSrc: Oral   SpO2: 99%     No LMP recorded.    Physical Exam  Vitals and nursing note reviewed.   Constitutional:       General: She is not in acute distress.     Appearance: Normal appearance. She is not ill-appearing, toxic-appearing or diaphoretic.   HENT:      Head: Normocephalic and atraumatic.      Nose: Congestion present.      Mouth/Throat:      Mouth: Mucous membranes are moist.   Eyes:      Extraocular Movements: Extraocular movements intact.      Conjunctiva/sclera: Conjunctivae normal.      Pupils: Pupils are equal, round, and reactive to light.   Cardiovascular:      Rate and Rhythm: Normal rate and regular rhythm.      Pulses: Normal pulses.      Heart sounds: Normal heart sounds.   Pulmonary:      Effort: Pulmonary effort is normal. No respiratory distress.      Breath sounds: No stridor. Rhonchi present. No rales.   Chest:      Chest wall: No tenderness.   Musculoskeletal:         General: Normal range of motion.      Cervical back: Normal range of motion and neck supple.   Skin:     General: Skin is warm and dry.      Capillary Refill: Capillary refill takes less than 2 seconds.   Neurological:      General: No focal deficit present.      Mental Status: She is alert and oriented to person, place, and time.   Psychiatric:         Mood and Affect: Mood normal.         Behavior: Behavior normal.         Procedures      Assessment/Plan   Allergies, medications, history, and pertinent labs/EKGs/Imaging reviewed by PASCUAL Gaines.     Medical Decision Making    Patient is well appearing, afebrile, non toxic, not hypoxic, and appropriate for outpatient treatment and management at time of evaluation. Patient presents with concern for ongoing flulike symptoms as described above.      Differential includes but not limited to: Pneumonia, COVID, bronchitis, URI, other    On exam, patient is alert and oriented.  She is afebrile appears well hydrated.  Vitals within normal limits.  She has rhonchi in all fields bilaterally.  Oxygen saturation on room air is 99%.    I reviewed her chart and saw she was diagnosed with both COVID and pneumonia on Saturday.  She was started on doxycycline at that time.  She notes she has been taking the medication as prescribed but still does not feel improved.  Considered repeating chest x-ray however, vitals are not concerning and I feel as though the risk outweighs the benefits.    Recommended continue taking the doxycycline as previously prescribed.  A course of azithromycin and Medrol Dosepak called into patient's pharmacy use as directed.  Advised if she is still not feeling better in 2 to 3 days, she should return or follow-up with her PCP.  Red flags and ER precautions discussed.  Patient is in agreement this plan.  She was discharged stable condition.  All questions and concerns addressed               Orders and Diagnoses  Diagnoses and all orders for this visit:  COVID-19  -     methylPREDNISolone (Medrol Dospak) 4 mg tablets; Take as directed on package.  Pneumonia due to infectious organism, unspecified laterality, unspecified part of lung  -     azithromycin (Zithromax) 250 mg tablet; Take 2 tabs (500 mg) by mouth today, than 1 daily for 4 days.      Medical Admin Record      Follow Up Instructions  No follow-ups on file.    Patient disposition: Home    Electronically signed by PASCUAL Gaines  6:27 PM

## 2025-03-12 ENCOUNTER — APPOINTMENT (OUTPATIENT)
Dept: PRIMARY CARE | Facility: CLINIC | Age: 40
End: 2025-03-12
Payer: COMMERCIAL